# Patient Record
Sex: FEMALE | Race: AMERICAN INDIAN OR ALASKA NATIVE
[De-identification: names, ages, dates, MRNs, and addresses within clinical notes are randomized per-mention and may not be internally consistent; named-entity substitution may affect disease eponyms.]

---

## 2018-04-20 ENCOUNTER — HOSPITAL ENCOUNTER (EMERGENCY)
Dept: HOSPITAL 14 - H.ER | Age: 38
Discharge: HOME | End: 2018-04-20
Payer: MEDICAID

## 2018-04-20 VITALS
HEART RATE: 64 BPM | OXYGEN SATURATION: 99 % | DIASTOLIC BLOOD PRESSURE: 98 MMHG | SYSTOLIC BLOOD PRESSURE: 144 MMHG | TEMPERATURE: 98.2 F | RESPIRATION RATE: 16 BRPM

## 2018-04-20 DIAGNOSIS — K04.7: Primary | ICD-10-CM

## 2018-04-20 NOTE — ED PDOC
HPI: Dental Pain/Injury


Time Seen by Provider: 18 20:52


Chief Complaint (Nursing): Dental Pain


History Per: Patient


Additional Complaint(s): 





Pt. states yesterday she developed L upper toothache which became worse this 

morning. Pt. states she contacted her dentist who advised her to come to ED to 

obtain an Rx for antibiotic. Reports that she has a scheduled appointment with 

her dentist next week. Further states pain is controlled with Motrin at home. 

Denies trauma, fever, numbness, tingling, bleeding. 





Of note, pt. is requesting a pregnancy test. Denies vaginal bleeding, abd pain, 

pelvic pain. LMP: 2018.





Past Medical History


Reviewed: Historical Data, Nursing Documentation, Vital Signs


Vital Signs: 


 Last Vital Signs











Temp  98.2 F   18 20:44


 


Pulse  64   18 20:44


 


Resp  16   18 20:44


 


BP  144/98 H  18 20:44


 


Pulse Ox  99   18 20:44














- Medical History


PMH: Anemia





- Surgical History


Surgical History:  (x3)





- Family History


Family History: States: No Known Family Hx





- Home Medications


Home Medications: 


 Ambulatory Orders











 Medication  Instructions  Recorded


 


Naproxen [Naprosyn] 500 mg PO Q12H #20 tab 16


 


Oseltamivir [Tamiflu] 75 mg PO BID #10 cap 16


 


Dicyclomine [Bentyl] 20 mg PO Q12 PRN #20 tab 16


 


Ondansetron ODT [Zofran ODT] 4 mg PO Q6H PRN #16 odt 16


 


Amoxicillin/Clavulanate [Augmentin 1 tab PO BID #20 tab 17





875 MG-125 MG]  


 


Amoxicillin/Clavulanate [Augmentin 1 tab PO BID #20 tab 18





500 MG-125 MG]  














- Allergies


Allergies/Adverse Reactions: 


 Allergies











Allergy/AdvReac Type Severity Reaction Status Date / Time


 


No Known Allergies Allergy   Verified 16 17:54














Review of Systems


ROS Statement: Except As Marked, All Systems Reviewed And Found Negative





Physical Exam





- Physical Exam


Appears: Positive for: Well, Non-toxic, No Acute Distress


Skin: Positive for: Normal Color, Warm.  Negative for: Rash


Eye Exam: Positive for: Normal appearance


ENT: Positive for: Other (L maxillary lateral incisor tenderness with minimal 

gingival swelling ).  Negative for: Sinus Pain/Drainage


Neurologic/Psych: Positive for: Alert, Oriented, Other (no facial asymmetry ).  

Negative for: Aphasia, Facial Droop





- Laboratory Results


Urine Pregnancy POC: Negative





- ECG


O2 Sat by Pulse Oximetry: 99





Disposition





- Clinical Impression


Clinical Impression: 


 Dental abscess








- Patient ED Disposition


Is Patient to be Admitted: No





- Disposition


Referrals: 


[facility] [Outside]


Disposition: Routine/Home


Disposition Time: 22:15


Condition: STABLE


Additional Instructions: 


Follow up with your dentist as scheduled.


Return to ED immediately if symptoms worsen. 


Prescriptions: 


Amoxicillin/Clavulanate [Augmentin 500 MG-125 MG] 1 tab PO BID #20 tab


Instructions:  Tooth Abscess (DC)


Forms:  [facility] (English)


Print Language: ENGLISH

## 2018-05-05 ENCOUNTER — HOSPITAL ENCOUNTER (EMERGENCY)
Dept: HOSPITAL 14 - H.ER | Age: 38
Discharge: HOME | End: 2018-05-05
Payer: MEDICAID

## 2018-05-05 VITALS
RESPIRATION RATE: 18 BRPM | SYSTOLIC BLOOD PRESSURE: 140 MMHG | HEART RATE: 70 BPM | DIASTOLIC BLOOD PRESSURE: 91 MMHG | OXYGEN SATURATION: 100 % | TEMPERATURE: 98.6 F
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
## 2018-05-05 DIAGNOSIS — S09.90XA: Primary | ICD-10-CM
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
## 2018-05-05 DIAGNOSIS — S09.93XA: ICD-10-CM
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
## 2019-01-07 ENCOUNTER — HOSPITAL ENCOUNTER (EMERGENCY)
Dept: HOSPITAL 14 - H.ER | Age: 39
Discharge: HOME | End: 2019-01-07
Payer: MEDICAID

## 2019-01-07 VITALS
DIASTOLIC BLOOD PRESSURE: 78 MMHG | RESPIRATION RATE: 19 BRPM | HEART RATE: 78 BPM | SYSTOLIC BLOOD PRESSURE: 128 MMHG | TEMPERATURE: 97.6 F

## 2019-01-07 VITALS — OXYGEN SATURATION: 98 %

## 2019-01-07 VITALS — BODY MASS INDEX: 24.5 KG/M2

## 2019-01-07 DIAGNOSIS — L03.314: Primary | ICD-10-CM

## 2019-01-07 NOTE — ED PDOC
HPI: Skin/Bite Injury


Time Seen by Provider: 19 11:34


Chief Complaint (Nursing): Female Genitourinary


Chief Complaint (Provider): Groin bump


History Per: Patient


History/Exam Limitations: no limitations


Additional Complaint(s): 





Pt reports bump to L groin X 1 month, getting bigger, did not take medication 

for pain.  Denies fever, discharge.





Past Medical History


Reviewed: Nursing Documentation, Vital Signs


Vital Signs: 





                                Last Vital Signs











Temp  97 F L  19 10:47


 


Pulse  74   19 10:47


 


Resp  20   19 10:47


 


BP  120/70   19 10:47


 


Pulse Ox  98   19 10:47














- Medical History


PMH: Anemia





- Surgical History


Surgical History:  (x3)





- Family History


Family History: States: Unknown Family Hx





- Social History


Current smoker - smoking cessation education provided: No


Alcohol: None





- Home Medications


Home Medications: 


                                Ambulatory Orders











 Medication  Instructions  Recorded


 


Naproxen [Naprosyn] 500 mg PO Q12H #20 tab 16


 


Oseltamivir Cap [Tamiflu] 75 mg PO BID #10 cap 16


 


Dicyclomine [Bentyl] 20 mg PO Q12 PRN #20 tab 16


 


Ondansetron ODT [Zofran ODT] 4 mg PO Q6H PRN #16 odt 16


 


Amoxicillin/Clavulanate [Augmentin 1 tab PO BID #20 tab 17





875 MG-125 MG]  


 


Amoxicillin/Clavulanate [Augmentin 1 tab PO BID #20 tab 18





500 MG-125 MG]  


 


Ibuprofen [Motrin] 600 mg PO Q6H PRN #20 tab 19


 


Sulfamethoxazole/Trimethoprim 1 tab PO BID #13 tab 19





[Bactrim  mg-160 mg]  














- Allergies


Allergies/Adverse Reactions: 


                                    Allergies











Allergy/AdvReac Type Severity Reaction Status Date / Time


 


No Known Allergies Allergy   Verified 18 20:32














Review of Systems


Constitutional: Negative for: Fever, Chills


Skin: Negative for: Rash, Lesions





Physical Exam





- Reviewed


Nursing Documentation Reviewed: Yes


Vital Signs Reviewed: Yes





- Physical Exam


Appears: Positive for: Well, No Acute Distress


Skin: Positive for: Normal Color, Warm, Dry


Pelvic Exam: Positive for: Other ((Chaperone: Carmen Rios RN): Tender area L 

groin, no vesicles, no discharge, nonfluctuant)


Neurologic/Psych: Positive for: Alert, Oriented





- ECG


O2 Sat by Pulse Oximetry: 98





Medical Decision Making


Medical Decision Makin yo female with groin cellulitis.


- Bactrim DS


- Motrin





Disposition





- Clinical Impression


Clinical Impression: 


 Cellulitis of groin, left








- Disposition


Disposition: Routine/Home


Disposition Time: 13:07


Condition: STABLE


Additional Instructions: 


FOLLOW-UP WITH PMD WITHIN 2 DAYS FOR REEVALUATION. 


Prescriptions: 


Ibuprofen [Motrin] 600 mg PO Q6H PRN #20 tab


 PRN Reason: Pain, Moderate (4-7)


Sulfamethoxazole/Trimethoprim [Bactrim  mg-160 mg] 1 tab PO BID #13 tab


Instructions:  Cellulitis and Erysipelas (Skin Infections)


Forms:  [facility] (English)